# Patient Record
Sex: MALE | Race: OTHER | HISPANIC OR LATINO | URBAN - METROPOLITAN AREA
[De-identification: names, ages, dates, MRNs, and addresses within clinical notes are randomized per-mention and may not be internally consistent; named-entity substitution may affect disease eponyms.]

---

## 2018-07-18 ENCOUNTER — EMERGENCY (EMERGENCY)
Facility: HOSPITAL | Age: 43
LOS: 1 days | Discharge: HOME | End: 2018-07-18
Attending: EMERGENCY MEDICINE | Admitting: EMERGENCY MEDICINE
Payer: SELF-PAY

## 2018-07-18 VITALS
RESPIRATION RATE: 18 BRPM | DIASTOLIC BLOOD PRESSURE: 72 MMHG | OXYGEN SATURATION: 98 % | WEIGHT: 231.93 LBS | SYSTOLIC BLOOD PRESSURE: 131 MMHG | HEART RATE: 93 BPM | TEMPERATURE: 98 F

## 2018-07-18 VITALS
DIASTOLIC BLOOD PRESSURE: 78 MMHG | HEART RATE: 75 BPM | RESPIRATION RATE: 16 BRPM | SYSTOLIC BLOOD PRESSURE: 128 MMHG | TEMPERATURE: 98 F | OXYGEN SATURATION: 99 %

## 2018-07-18 DIAGNOSIS — L02.416 CUTANEOUS ABSCESS OF LEFT LOWER LIMB: ICD-10-CM

## 2018-07-18 PROCEDURE — 99283 EMERGENCY DEPT VISIT LOW MDM: CPT | Mod: 25

## 2018-07-18 PROCEDURE — 10061 I&D ABSCESS COMP/MULTIPLE: CPT

## 2018-07-18 RX ORDER — IBUPROFEN 200 MG
600 TABLET ORAL ONCE
Qty: 0 | Refills: 0 | Status: COMPLETED | OUTPATIENT
Start: 2018-07-18 | End: 2018-07-18

## 2018-07-18 RX ORDER — CEPHALEXIN 500 MG
1 CAPSULE ORAL
Qty: 20 | Refills: 0 | OUTPATIENT
Start: 2018-07-18 | End: 2018-07-27

## 2018-07-18 RX ORDER — CEPHALEXIN 500 MG
500 CAPSULE ORAL ONCE
Qty: 0 | Refills: 0 | Status: COMPLETED | OUTPATIENT
Start: 2018-07-18 | End: 2018-07-18

## 2018-07-18 RX ORDER — IBUPROFEN 200 MG
1 TABLET ORAL
Qty: 28 | Refills: 0 | OUTPATIENT
Start: 2018-07-18 | End: 2018-07-24

## 2018-07-18 RX ORDER — AZTREONAM 2 G
1 VIAL (EA) INJECTION
Qty: 20 | Refills: 0 | OUTPATIENT
Start: 2018-07-18 | End: 2018-07-27

## 2018-07-18 RX ADMIN — Medication 2 TABLET(S): at 17:56

## 2018-07-18 RX ADMIN — Medication 500 MILLIGRAM(S): at 17:56

## 2018-07-18 RX ADMIN — Medication 600 MILLIGRAM(S): at 17:56

## 2018-07-18 NOTE — ED PROVIDER NOTE - ATTENDING CONTRIBUTION TO CARE
pt w distal thigh, redness, pain, some suppuration. clinically cellulitic us shows fluid collection, drained by PA, abxs started, packing left.

## 2018-07-18 NOTE — ED PROVIDER NOTE - MEDICAL DECISION MAKING DETAILS
LLE abscess confirmed on bedside sono with overlying cellulitis, i&d performed, packed, return in 2 days for wound check/packing removal, rx bactrim, keflex, leg elevation, motrin prn pain return precautions discussed, will dc

## 2018-07-18 NOTE — ED PROVIDER NOTE - OBJECTIVE STATEMENT
43 y/o M, NKDA, presents to the ED with abscess to the left thigh. He states it started as a small pimple 1 week ago and had been progressively worsening, redness and swelling were worst on Sunday (3 days ago). He popped it yesterday with some darkish drainage. Swelling and redness have mildly improved. He reports he shaved that area recently and the pimple developed afterwards. Denies injections to the area. No fevers, no chills. Denies hx of skin infections. 43 y/o M c/o abscess to the left thigh. He states it started as a small pimple 1 week ago and had been progressively worsening, redness and swelling. He popped it yesterday with some darkish drainage. Swelling and redness have mildly improved since. He reports he shaved that area recently and the pimple developed afterwards. Denies IVDA. No fevers, no chills. Denies hx of skin infections.

## 2018-07-18 NOTE — ED PROVIDER NOTE - NS_EDPROVIDERDISPOUSERTYPE_ED_A_ED
Scribe Attestation (For Scribes USE Only)... Scribe Attestation (For Scribes USE Only).../I have personally evaluated and examined the patient. The Attending was available to me as a supervising provider if needed. Attending Attestation (For Attendings USE Only).../I have personally evaluated and examined the patient. The Attending was available to me as a supervising provider if needed./Scribe Attestation (For Scribes USE Only)...

## 2018-07-18 NOTE — ED PROVIDER NOTE - SKIN, MLM
5cm diameter area of redness that's draining. Minimal tenderness to palpation. No streaking. Soft compartments. Distal pulses intact. No sensory or motor deficits. LLE: 5cm diameter area of redness with minimal serous drainage. Mild tenderness to palpation. No streaking. Soft compartments. Distal pulses intact. No sensory or motor deficits. ambulatory

## 2018-07-18 NOTE — ED PROVIDER NOTE - MUSCULOSKELETAL, MLM
Spine appears normal, range of motion is not limited, no muscle or joint tenderness. Ambulatory as below

## 2018-10-08 ENCOUNTER — EMERGENCY (EMERGENCY)
Facility: HOSPITAL | Age: 43
LOS: 1 days | Discharge: HOME | End: 2018-10-08
Admitting: EMERGENCY MEDICINE
Payer: SELF-PAY

## 2018-10-08 VITALS
WEIGHT: 225.09 LBS | DIASTOLIC BLOOD PRESSURE: 81 MMHG | RESPIRATION RATE: 16 BRPM | TEMPERATURE: 99 F | SYSTOLIC BLOOD PRESSURE: 120 MMHG | HEART RATE: 109 BPM | OXYGEN SATURATION: 97 %
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
## 2018-10-08 DIAGNOSIS — M25.512 PAIN IN LEFT SHOULDER: ICD-10-CM

## 2018-10-08 DIAGNOSIS — S43.102A UNSPECIFIED DISLOCATION OF LEFT ACROMIOCLAVICULAR JOINT, INITIAL ENCOUNTER: ICD-10-CM

## 2018-10-08 DIAGNOSIS — S43.085A OTHER DISLOCATION OF LEFT SHOULDER JOINT, INITIAL ENCOUNTER: ICD-10-CM

## 2018-10-08 DIAGNOSIS — Z79.2 LONG TERM (CURRENT) USE OF ANTIBIOTICS: ICD-10-CM

## 2018-10-08 DIAGNOSIS — Z79.1 LONG TERM (CURRENT) USE OF NON-STEROIDAL ANTI-INFLAMMATORIES (NSAID): ICD-10-CM
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
## 2018-10-08 PROCEDURE — 23650 CLTX SHO DSLC W/MNPJ WO ANES: CPT | Mod: 54

## 2018-10-08 PROCEDURE — 73030 X-RAY EXAM OF SHOULDER: CPT | Mod: 26,LT

## 2018-10-08 PROCEDURE — 73030 X-RAY EXAM OF SHOULDER: CPT | Mod: 26,LT,76

## 2018-10-08 PROCEDURE — 99284 EMERGENCY DEPT VISIT MOD MDM: CPT | Mod: 25

## 2018-10-08 RX ORDER — OXYCODONE AND ACETAMINOPHEN 5; 325 MG/1; MG/1
1 TABLET ORAL ONCE
Qty: 0 | Refills: 0 | Status: DISCONTINUED | OUTPATIENT
Start: 2018-10-08 | End: 2018-10-08

## 2018-10-08 RX ADMIN — OXYCODONE AND ACETAMINOPHEN 1 TABLET(S): 5; 325 TABLET ORAL at 17:13

## 2018-10-08 NOTE — ED PROVIDER NOTE - DIAGNOSTIC INTERPRETATION
Xray (wet reads) interpreted by DINESH DELEON   Xray shoulder - no soft tissue swelling. no acute fx or dislocation, joint space intact, no effusion noted. No foreign body noted Xray (wet reads) interpreted by DINESH DELEON   Xray shoulder - acute anteroinferior dislocation of the L shoulder. no acute fx, joint space intact, no effusion noted. No foreign body noted Xray (wet reads) interpreted by DINESH DELEON   Xray shoulder - acute anteroinferior dislocation of the L shoulder. no acute fx, joint space intact, no effusion noted. No foreign body noted   xray shoulder post reduction - adequate alignment, complete reduction noted, no acute fx, slight AC separation, no effusion

## 2018-10-08 NOTE — ED PROVIDER NOTE - CARE PLAN
Principal Discharge DX:	Shoulder dislocation, left, initial encounter  Secondary Diagnosis:	AC separation, left, initial encounter

## 2018-10-08 NOTE — ED PROVIDER NOTE - PHYSICAL EXAMINATION
Gen - WDWN, NAD, comfortable and non-toxic appearing  Skin - warm, dry, intact   HEENT - AT/NC, airway patent, neck supple   CV - S1S2, R/R/R  Resp - CTAB, no r/r/w  GI - soft, ND, NT, no CVAT b/l   MS - w/w/p, L shoulder with TTP to the anterior shoulder region, no erythema, ecchymosis, crepitus, joint laxity, or deformity, restricted ROM 2/2 pain, NV intact. +SILT, symmetric palpable distal pulses b/l   Neuro - AxOx3, ambulatory without gait disturbance Gen - WDWN, NAD, comfortable and non-toxic appearing  Skin - warm, dry, intact   HEENT - AT/NC, airway patent, neck supple   CV - S1S2, R/R/R  Resp - CTAB, no r/r/w  GI - soft, ND, NT, no CVAT b/l   MS - w/w/p, L shoulder with TTP to the anterior shoulder region with step off and slight ecchymosis to the L proximal humerus, arm held in flexion and internal rotation, no erythema, crepitus, joint laxity, or deformity, restricted ROM 2/2 pain, NV intact. +SILT, symmetric palpable distal pulses b/l   Neuro - AxOx3, ambulatory without gait disturbance Gen - WDWN M, uncomfortable, in pain, and non-toxic appearing  Skin - warm, dry, intact   HEENT - AT/NC, airway patent, neck supple, FROM, no midline tenderness or step off   CV - S1S2, R/R/R  Resp - CTAB, no r/r/w  GI - soft, ND, NT, no CVAT b/l   MS - w/w/p, L shoulder with TTP to the anterior shoulder region with step off and slight ecchymosis to the L proximal humerus, arm held in flexion and internal rotation, no erythema, crepitus, joint laxity, or deformity, restricted ROM 2/2 pain, NV intact. +SILT, symmetric palpable distal pulses b/l   Neuro - AxOx3, ambulatory without gait disturbance

## 2018-10-08 NOTE — ED ADULT NURSE NOTE - NSFALLRSKUNASSIST_ED_ALL_ED
Normocytic anemia. Baseline is 11.5 from last admission. Currently 10.9. Most likely 2/2 fluid shifts intravascularly, and is not far from baseline.  Iron studies from 12/2016 was c/w anemia of chronic disease. Currently no signs or symptoms of active bleed.   - continue to monitor for now Normocytic anemia. Baseline is 11.5 from last admission. Currently 10.9. Most likely 2/2 fluid shifts intravascularly, and is not far from baseline.  Iron studies from 12/2016 was c/w anemia of chronic disease. Currently no signs or symptoms of active bleed.   - continue to monitor for now no

## 2018-10-08 NOTE — ED PROVIDER NOTE - NSFOLLOWUPINSTRUCTIONS_ED_ALL_ED_FT
PLEASE SEE DR. CAMARENA AT [address]LO[address] ORTHOPEDIST SUITE AT 1PM-3PM TOMORROW (10/9) FOR FOLLOW UP

## 2018-10-08 NOTE — ED ADULT NURSE NOTE - OBJECTIVE STATEMENT
Pt presents to ED with c/o left upper extremity pain after an injury on Friday while moving furniture. Pt with limited ROM 2/2 pain/injury, SILT, no paresthesias, + distal pulses.

## 2018-10-08 NOTE — ED PROCEDURE NOTE - GENERAL PROCEDURE DETAILS
Site appropriately prepped with iodine soultion.  20cc plain 1% lidocaine injected into intra-articular space of the L AC joint under aseptic techniques.  no active bleeding s/p intra-articular block and pt tolerated procedure well without complications.

## 2018-10-08 NOTE — ED PROVIDER NOTE - MEDICAL DECISION MAKING DETAILS
pt found to have _ fx, closed reduction performed at bedside by me, pt placed in _ splint, post-reduction film reveals adequate alignment, pt is NV intact post-splinting, pt instructed to RICE affected joint and f/u with Dr. Yepez tomorrow for outpt reassessment pt found to have atraumatic L shoulder dislocation x 2d old, NV intact on exam, closed reduction performed at bedside by me, pt placed in sling, post-reduction film reveals adequate alignment, case discussed with Dr. Yepez and will see pt tmr, pt is NV intact post-splinting, pt instructed to RICE affected joint and f/u with Dr. Yepez tomorrow for outpt reassessment pt found to have atraumatic L shoulder dislocation x 2d old, NV intact on exam, closed reduction performed at bedside by me, pt placed in sling, post-reduction film reveals adequate alignment, case discussed with Dr. Yepez and will see pt tmr, pt is NV intact post-reduction, pt instructed to RICE affected joint and f/u with Dr. Yepez tomorrow for outpt reassessment

## 2018-10-08 NOTE — ED ADULT NURSE NOTE - NSIMPLEMENTINTERV_GEN_ALL_ED
Implemented All Universal Safety Interventions:  [place] to call system. Call bell, personal items and telephone within reach. Instruct patient to call for assistance. Room bathroom lighting operational. Non-slip footwear when patient is off stretcher. Physically safe environment: no spills, clutter or unnecessary equipment. Stretcher in lowest position, wheels locked, appropriate side rails in place.

## 2018-10-08 NOTE — ED PROVIDER NOTE - CARE PROVIDER_API CALL
Shon Yepez), Orthopaedic Surgery  [address]  [address] [place] [address]  Phone: [phone]  Fax: [phone]

## 2018-10-08 NOTE — ED PROVIDER NOTE - OBJECTIVE STATEMENT
43 yo M with PMHx of 43 yo M with no known PMHx, RHD, presenting c/o L shoulder pain and restricted ROM 2/2 pain. Pt was helping 41 yo M with no known PMHx, RHD, presenting c/o L shoulder pain and restricted ROM 2/2 pain. Pt was helping his friends move some furniture two days ago, the furniture fell, didn't hit him, but felt the weight on his L shoulder. Noted immediate pain, restricted ROM and unable to move his shoulder/raise his arm subsequently.  Received an injection for pain by his friend (who's a RN). with no improvement. Denies fall, direct trauma, LOC, break in the skin, paresthesia, numbness, tingling, redness, bleeding, d/c, HA, dizziness, SOB, CP, palpitations, N/V, focal weakness, neck/back pain, and malaise.

## 2018-10-08 NOTE — ED PROCEDURE NOTE - NS ED PERI VASCULAR NEG
ecchymosis to the L proximal humerus/no paresthesia/fingers/toes warm to touch/no cyanosis of extremity/capillary refill time < 2 seconds

## 2018-10-08 NOTE — ED ADULT TRIAGE NOTE - CHIEF COMPLAINT QUOTE
c/o right sided shoulder pain since Friday night after helping friend move. decreased ROM to affected shoulder. denies numbness/tingling. sling placed in triage.

## 2019-05-07 ENCOUNTER — EMERGENCY (EMERGENCY)
Facility: HOSPITAL | Age: 44
LOS: 1 days | Discharge: HOME | End: 2019-05-07
Attending: EMERGENCY MEDICINE | Admitting: EMERGENCY MEDICINE
Payer: MEDICAID

## 2019-05-07 VITALS
DIASTOLIC BLOOD PRESSURE: 88 MMHG | RESPIRATION RATE: 16 BRPM | WEIGHT: 240.08 LBS | OXYGEN SATURATION: 98 % | HEART RATE: 110 BPM | SYSTOLIC BLOOD PRESSURE: 153 MMHG | TEMPERATURE: 98 F

## 2019-05-07 PROCEDURE — 99284 EMERGENCY DEPT VISIT MOD MDM: CPT

## 2019-05-07 RX ORDER — IBUPROFEN 200 MG
1 TABLET ORAL
Qty: 40 | Refills: 0 | OUTPATIENT
Start: 2019-05-07 | End: 2019-05-16

## 2019-05-07 RX ORDER — AZTREONAM 2 G
1 VIAL (EA) INJECTION
Qty: 20 | Refills: 0 | OUTPATIENT
Start: 2019-05-07 | End: 2019-05-16

## 2019-05-07 RX ORDER — VANCOMYCIN HCL 1 G
1000 VIAL (EA) INTRAVENOUS ONCE
Qty: 0 | Refills: 0 | Status: DISCONTINUED | OUTPATIENT
Start: 2019-05-07 | End: 2019-05-07

## 2019-05-07 RX ORDER — VANCOMYCIN HCL 1 G
1500 VIAL (EA) INTRAVENOUS ONCE
Qty: 0 | Refills: 0 | Status: COMPLETED | OUTPATIENT
Start: 2019-05-07 | End: 2019-05-07

## 2019-05-07 RX ORDER — LIDOCAINE HYDROCHLORIDE AND EPINEPHRINE 10; 10 MG/ML; UG/ML
10 INJECTION, SOLUTION INFILTRATION; PERINEURAL ONCE
Qty: 0 | Refills: 0 | Status: COMPLETED | OUTPATIENT
Start: 2019-05-07 | End: 2019-05-07

## 2019-05-07 RX ORDER — KETOROLAC TROMETHAMINE 30 MG/ML
30 SYRINGE (ML) INJECTION ONCE
Qty: 0 | Refills: 0 | Status: DISCONTINUED | OUTPATIENT
Start: 2019-05-07 | End: 2019-05-07

## 2019-05-07 RX ADMIN — Medication 300 MILLIGRAM(S): at 14:15

## 2019-05-07 RX ADMIN — LIDOCAINE HYDROCHLORIDE AND EPINEPHRINE 10 MILLILITER(S): 10; 10 INJECTION, SOLUTION INFILTRATION; PERINEURAL at 14:16

## 2019-05-07 RX ADMIN — Medication 30 MILLIGRAM(S): at 14:54

## 2019-05-07 NOTE — ED PROVIDER NOTE - NSFOLLOWUPINSTRUCTIONS_ED_ALL_ED_FT
- Your leg has cellulitis. No obvious drainable abscess found on the ultrasound.   - Recommend taking Antibiotics. In addition, apply warm compress to the area 3-4 times a day.  - Take Motrin 600mg every 6 hour for pain. Take medication with food.  - Take Percocet as needed for pain. While taking Percocet, you cannot drive or operate machinery.   - Return to the ED with any worsening of the symptoms such as increased pain despite the pain medication, increased swelling, or fever as you may need IV Antibiotics and/ or admission.

## 2019-05-07 NOTE — ED PROVIDER NOTE - PROGRESS NOTE DETAILS
Bedside ultrasound showing no pocket of abscess to drain. A lot of cobble stoning evident on US. Plans to IV Abx, and PO Abx, with return precaution if there is no improvements. Pt agrees. Bedside ultrasound showing no pocket of abscess to drain. A lot of cobble stoning consistent with cellulitis. Plans to IV Abx, and PO Abx with Bactrim, with return precaution if there is no improvements for IV Abx/ possible admission. Warm Compress. Pt agrees. Will huan the border of cellulitis over the calf.

## 2019-05-07 NOTE — ED PROVIDER NOTE - ATTENDING CONTRIBUTION TO CARE
43 M- no sig pmh- denies IVDA- hx of prior abscess- co swelling/redness to R leg x 2-3 days- prog worsening over the past few days  no hx of vte/no risk factors vte  no injury to site  vss  s1s2 lungs cta bl  redness, area of swelling/drainage w surrounding cellulitis  IMP- Abscess/Cellulitis  I and D, antibiotics

## 2019-05-07 NOTE — ED PROVIDER NOTE - OBJECTIVE STATEMENT
43M presenting with rt. calf abscess with drainage and swelling. Hx of abscess above left knee s/p I and D. Abscess started 5d ago, progressively getting worse, associated with drainage x today. Pt has increasing pain and swelling. Denies any medical problems and/or use of needles. Denies any fever, chills. No hx of injury to the area.

## 2019-05-07 NOTE — ED ADULT NURSE NOTE - NSIMPLEMENTINTERV_GEN_ALL_ED
Implemented All Universal Safety Interventions:  [place] to call system. Call bell, personal items and telephone within reach. Instruct patient to call for assistance. Room bathroom lighting operational. Non-slip footwear when patient is off stretcher. Physically safe environment: no spills, clutter or unnecessary equipment. Stretcher in lowest position, wheels locked, appropriate side rails in place.

## 2019-05-11 DIAGNOSIS — Z79.1 LONG TERM (CURRENT) USE OF NON-STEROIDAL ANTI-INFLAMMATORIES (NSAID): ICD-10-CM

## 2019-05-11 DIAGNOSIS — L03.115 CELLULITIS OF RIGHT LOWER LIMB: ICD-10-CM

## 2019-05-11 DIAGNOSIS — Z79.2 LONG TERM (CURRENT) USE OF ANTIBIOTICS: ICD-10-CM

## 2019-05-11 DIAGNOSIS — Z79.891 LONG TERM (CURRENT) USE OF OPIATE ANALGESIC: ICD-10-CM

## 2019-05-11 DIAGNOSIS — M79.661 PAIN IN RIGHT LOWER LEG: ICD-10-CM

## 2023-04-29 NOTE — ED ADULT NURSE NOTE - NS ED NOTE  TALK SOMEONE YN
No Bilateral Rotation Flap Text: The defect edges were debeveled with a #15 scalpel blade. Given the location of the defect, shape of the defect and the proximity to free margins a bilateral rotation flap was deemed most appropriate. Using a sterile surgical marker, an appropriate rotation flap was drawn incorporating the defect and placing the expected incisions within the relaxed skin tension lines where possible. The area thus outlined was incised deep to adipose tissue with a #15 scalpel blade. The skin margins were undermined to an appropriate distance in all directions utilizing iris scissors. Following this, the designed flap was carried over into the primary defect and sutured into place.

## 2024-06-11 ENCOUNTER — EMERGENCY (EMERGENCY)
Facility: HOSPITAL | Age: 49
LOS: 1 days | Discharge: HOME | End: 2024-06-11
Attending: EMERGENCY MEDICINE | Admitting: EMERGENCY MEDICINE
Payer: MEDICAID

## 2024-06-11 VITALS
OXYGEN SATURATION: 96 % | WEIGHT: 250 LBS | RESPIRATION RATE: 16 BRPM | HEART RATE: 98 BPM | SYSTOLIC BLOOD PRESSURE: 119 MMHG | TEMPERATURE: 98 F | DIASTOLIC BLOOD PRESSURE: 77 MMHG

## 2024-06-11 VITALS
RESPIRATION RATE: 18 BRPM | HEART RATE: 89 BPM | TEMPERATURE: 98 F | OXYGEN SATURATION: 98 % | SYSTOLIC BLOOD PRESSURE: 124 MMHG | DIASTOLIC BLOOD PRESSURE: 68 MMHG

## 2024-06-11 DIAGNOSIS — F10.129 ALCOHOL ABUSE WITH INTOXICATION, UNSPECIFIED: ICD-10-CM

## 2024-06-11 DIAGNOSIS — Z59.01 SHELTERED HOMELESSNESS: ICD-10-CM

## 2024-06-11 PROCEDURE — 99283 EMERGENCY DEPT VISIT LOW MDM: CPT | Mod: 25

## 2024-06-11 SDOH — ECONOMIC STABILITY - HOUSING INSECURITY: SHELTERED HOMELESSNESS: Z59.01

## 2024-06-11 NOTE — ED PROVIDER NOTE - CLINICAL SUMMARY MEDICAL DECISION MAKING FREE TEXT BOX
Patient with acute alcohol intoxication but is completely alert and oriented x 4.  Exam is benign.  Patient denies any current complaints.  Given food and tolerated well.  Patient is stable for discharge.

## 2024-06-11 NOTE — ED ADULT NURSE NOTE - OBJECTIVE STATEMENT
Pt A&Ox3, ambulatory with steady gait, endorses to drinking alcohol today,  presents to ED after pt was not allowed to in shelter because of alcohol intoxication. Pt A&Ox3, ambulatory with steady gait, endorses to drinking alcohol today,  presents to ED after pt was not allowed to in shelter because of alcohol intoxication. Denies any falls or injuries

## 2024-06-11 NOTE — ED PROVIDER NOTE - OBJECTIVE STATEMENT
Patient is a 48-year-old male who currently resides in a local shelter and was denied access to his bed this evening as it was noted that he appeared intoxicated.  Patient was sent here for evaluation.  Patient is completely alert and does admit to drinking this evening.  He denies any other substances.  He denies any falls, altercations, or injuries.  He denies any chest pain, shortness of breath, cough, fever, chills, abdominal pain, nausea, vomiting, diarrhea.  Patient is requesting food.

## 2024-06-11 NOTE — ED PROVIDER NOTE - PATIENT PORTAL LINK FT
You can access the FollowMyHealth Patient Portal offered by [facility] by registering at the following website: [email or web address]. By joining [employer]’s FollowMyHealth portal, you will also be able to view your health information using other applications (apps) compatible with our system.

## 2024-06-12 ENCOUNTER — EMERGENCY (EMERGENCY)
Facility: HOSPITAL | Age: 49
LOS: 1 days | Discharge: HOME | End: 2024-06-12
Attending: EMERGENCY MEDICINE | Admitting: EMERGENCY MEDICINE
Payer: MEDICAID

## 2024-06-12 ENCOUNTER — EMERGENCY (EMERGENCY)
Facility: HOSPITAL | Age: 49
LOS: 1 days | Discharge: LEFT BEFORE DISCHARGE | End: 2024-06-12
Admitting: EMERGENCY MEDICINE
Payer: MEDICAID

## 2024-06-12 VITALS
DIASTOLIC BLOOD PRESSURE: 64 MMHG | OXYGEN SATURATION: 95 % | TEMPERATURE: 98 F | HEIGHT: 70 IN | SYSTOLIC BLOOD PRESSURE: 111 MMHG | RESPIRATION RATE: 16 BRPM | WEIGHT: 259.93 LBS | HEART RATE: 101 BPM

## 2024-06-12 VITALS
OXYGEN SATURATION: 97 % | HEIGHT: 70 IN | DIASTOLIC BLOOD PRESSURE: 72 MMHG | RESPIRATION RATE: 20 BRPM | TEMPERATURE: 97 F | SYSTOLIC BLOOD PRESSURE: 131 MMHG | HEART RATE: 91 BPM

## 2024-06-12 DIAGNOSIS — J06.9 ACUTE UPPER RESPIRATORY INFECTION, UNSPECIFIED: ICD-10-CM

## 2024-06-12 DIAGNOSIS — F10.129 ALCOHOL ABUSE WITH INTOXICATION, UNSPECIFIED: ICD-10-CM

## 2024-06-12 DIAGNOSIS — R05.9 COUGH, UNSPECIFIED: ICD-10-CM

## 2024-06-12 DIAGNOSIS — Z01.89 ENCOUNTER FOR OTHER SPECIFIED SPECIAL EXAMINATIONS: ICD-10-CM

## 2024-06-12 DIAGNOSIS — J34.89 OTHER SPECIFIED DISORDERS OF NOSE AND NASAL SINUSES: ICD-10-CM

## 2024-06-12 DIAGNOSIS — E11.9 TYPE 2 DIABETES MELLITUS WITHOUT COMPLICATIONS: ICD-10-CM

## 2024-06-12 DIAGNOSIS — Z53.21 PROCEDURE AND TREATMENT NOT CARRIED OUT DUE TO PATIENT LEAVING PRIOR TO BEING SEEN BY HEALTH CARE PROVIDER: ICD-10-CM

## 2024-06-12 LAB
FLUAV AG NPH QL: SIGNIFICANT CHANGE UP
FLUBV AG NPH QL: SIGNIFICANT CHANGE UP
RSV RNA NPH QL NAA+NON-PROBE: SIGNIFICANT CHANGE UP
SARS-COV-2 RNA SPEC QL NAA+PROBE: SIGNIFICANT CHANGE UP

## 2024-06-12 PROCEDURE — 99283 EMERGENCY DEPT VISIT LOW MDM: CPT | Mod: 25

## 2024-06-12 PROCEDURE — L9991: CPT

## 2024-06-12 NOTE — ED PROVIDER NOTE - PROGRESS NOTE DETAILS
patient serology is negative, stable for discharge at this time.  Exhibiting no difficulty breathing.

## 2024-06-12 NOTE — ED PROVIDER NOTE - PHYSICAL EXAMINATION
Constitutional:  Well appearing, slurred speech   HEENT: Airway patent, Nasal mucosa clear. Mouth with normal mucosa.   Eyes: Clear bilaterally, pupils equal, round and reactive to light.  Cardiac: Normal rate, regular rhythm.  Respiratory: Breath sounds clear and equal bilaterally.  GI: Abdomen soft, non-tender, no guarding.  MSK: Spine appears normal, range of motion is not limited, no muscle or joint tenderness  Neuro: Alert and oriented, no focal deficits, no motor or sensory deficits.  Skin: Skin normal color for race, warm, dry and intact. No evidence of rash.

## 2024-06-12 NOTE — ED ADULT TRIAGE NOTE - CHIEF COMPLAINT QUOTE
BIBA from street for etoh ingestion. Pt is aox4 with clear speech and steady gait. No injuries observed at triage. Is requesting a stretcher to lay in and something to eat.

## 2024-06-12 NOTE — ED PROVIDER NOTE - PATIENT PORTAL LINK FT
Wound Clinic Note  Patient Identification:  Name:  Gurwinder Harding  Age:  60 y.o.  Sex:  male  :  1962  MRN:  [id number]   Visit Number:  [id number]  Primary Care Physician:  Pastora Person APRN     Subjective     Chief complaint:     Left shin wound    History of presenting illness:     Patient is a 60 y.o. male with past medical history significant for PVD, and current everyday smoker. Presented today for evaluation of left shin wound. Reports area has been present for approximately 1 month. He had a fall hitting a metal box. Has been applying silvadene to the area. Completed course of Bactrim. Known history of PVD with intervention in 2019. He is scheduled for US at the end of the month. Reports increase in swelling to the distal leg and foot. Denies any fever or chills. No other associated signs or symptoms reported. Minimal drainage reported. Positive for claudication.     Interval history:     2022: Patient seen in clinic today for follow-up to left shin wound. Wound covered with eschar. Small amount of drainage without odor.   Reports increase in pain. Denies any fever or chills. States compliance with treatment regimen. DONNIE: Composite Right DONNIE: 1.14 Composite Left DONNIE: 0.89. X- ray: 1.  Soft tissue edema and probable ulceration in the left mid anterior lower leg. No acute bony findings. Glucsoe 128, albumin 3.75, prealbummin 31.5, CRP <0.30, WBC 8.79, sed rate 41.     2022: Patient seen in clinic today for follow up to left shin wound. Wound vac was applied last visit. He reports increase in pain to area. Wound with increase in slough. Bone exposed. Increase in pain. Denies any fever or chills.     2022:Patient seen in clinic today for follow up to left shin wound. Wound without significant changes. He continues to report moderate to severe pain. Biopsy results concerning for acute osteomyelitis. Wound culture reports MRSA. He denies any fever or chills. Reports compliance with  treatment regimen without complications. Swelling remains present.     07/06/2022: Patient seen in clinic today for follow up to left shin wound. He continues to report pain to the area. Wound with increase in slough. Bone exposed.  Denies any fever or chills.  He is receiving Daptomycin IV at this time to treat concerns of acute osteomyelitis. Swelling remains present to left foot and ankle. No new issues or concerns reported.    07/15/2022: Patient seen in clinic today for follow up to left shin wound. He continues to report pain to the area. Wound continues with slough Bone exposed does appear to have some areas of granulation present.  Denies any fever or chills.  He is receiving Daptomycin IV at this time to treat acute osteomyelitis. Swelling remains present to left foot and ankle. No new issues or concerns reported.    07/29/2022: Patient seen in clinic today for follow up to left shin wound. He continues to report pain to the area. Wound continues with slough Bone remains exposed increased granulation tissue. Denies any fever or chills.  He is receiving Daptomycin IV at this time to treat acute osteomyelitis 2 more weeks of antibiotics. Swelling remains present to left foot and ankle. No new issues or concerns reported.    08/05/2022: Patient seen in clinic today for follow-up to left shin wound. Wound continues with exposed bone, does appear to have granulation tissue present. He continues to report pain, this is unchanged from prior. Continues with IV antibiotics at this time. Denies any fever or chills. No new issues or concerns reported. Tolerating current wound treatment.     08/11/2022: Patient seen in clinic today for follow up to left shin wound. He continues to report pain to the area. Wound base red and moist. Bone remains exposed, with small area and granulation has increased.  Denies any fever or chills.  Completed course of Daptomycin. Swelling remains present to left foot and ankle. No new  issues or concerns reported.  ---------------------------------------------------------------------------------------------------------------------   Review of Systems   Constitutional: Negative for chills and fever.   HENT: Negative for congestion and rhinorrhea.    Respiratory: Negative for cough and shortness of breath.    Cardiovascular: Positive for leg swelling. Negative for chest pain.   Gastrointestinal: Negative for diarrhea, nausea and vomiting.   Musculoskeletal: Negative for gait problem.   Skin: Positive for wound.   Neurological: Negative for dizziness and weakness.      ---------------------------------------------------------------------------------------------------------------------   Past Medical History:   Diagnosis Date   • Alcohol abuse    • Arthritis    • Carotid stenosis    • Cerebrovascular accident (CVA) due to occlusion of left carotid artery (HCC) 07/15/2021   • ED (erectile dysfunction)    • History of degenerative disc disease    • Hydrocele in adult 12/29/2020   • Hypertension    • MRSA (methicillin resistant staph aureus) culture positive     patient states diagnosed approx 2 wks ago   • Neuropathy    • Peripheral vascular disease (HCC)     s/p arthrectomy follow by balloon angioplasty and stents of right and left SFA   • Tobacco abuse    • Vitamin D deficiency      Past Surgical History:   Procedure Laterality Date   • APPENDECTOMY     • ARTERIOGRAM N/A 9/19/2019    Procedure: Arteriogram;  Surgeon: Tong Azar MD;  Location: [facility] INVASIVE LOCATION;  Service: Cardiology   • BACK SURGERY      DISC RUPTURE REPAIR, L5   • CARDIAC CATHETERIZATION Right 10/29/2019    Procedure: Peripheral angiography;  Surgeon: Tong Azar MD;  Location: [facility] INVASIVE LOCATION;  Service: Cardiovascular   • ENDOSCOPY N/A 2/18/2022    Procedure: ESOPHAGOGASTRODUODENOSCOPY;  Surgeon: Christine Duran MD;  Location: [facility] OR;  Service: General;  Laterality: N/A;   • VASCULAR SURGERY  Bilateral      Family History   Problem Relation Age of Onset   • Hypertension Mother    • Cancer Father         LUNG   • Diabetes Father    • Hypertension Father    • Heart attack Other         GRANDFATHER     Social History     Socioeconomic History   • Marital status:    • Number of children: 1   Tobacco Use   • Smoking status: Current Every Day Smoker     Packs/day: 0.50     Years: 30.00     Pack years: 15.00     Types: Cigarettes   • Smokeless tobacco: Never Used   Vaping Use   • Vaping Use: Never used   Substance and Sexual Activity   • Alcohol use: Not Currently     Alcohol/week: 24.0 standard drinks     Types: 24 Cans of beer per week   • Drug use: No   • Sexual activity: Defer     ---------------------------------------------------------------------------------------------------------------------   Allergies:  Penicillins and Novocain [procaine]  ---------------------------------------------------------------------------------------------------------------------  Objective     ---------------------------------------------------------------------------------------------------------------------   Vital Signs:  Temp:  [97.7 °F (36.5 °C)] 97.7 °F (36.5 °C)  Heart Rate:  [80] 80  Resp:  [16] 16  BP: (157)/(86) 157/86  No data found.  There were no vitals filed for this visit.     on   ;      There is no height or weight on file to calculate BMI.  Wt Readings from Last 3 Encounters:   07/15/22 88.3 kg (194 lb 9.6 oz)   06/10/22 81.6 kg (180 lb)   05/26/22 82.1 kg (181 lb)       ---------------------------------------------------------------------------------------------------------------------   Physical Exam  Constitutional: Vital sign were reviewed (temperature, pulse, respiration, and blood pressure) and found to be within expected limits, general appearance was assessed and the patient was found to be in mild distress and calm and comfortable appears  Skin: Temperature:normal turgor and  temperatureColor: normal, no cyanosis, jaundice, pallor or bruising, Moisture: dry,Nails: thickened yellow toenails bed, Hair:thinning to lower extremities .  Physical Exam  Wound Assessment: Location: left anterior, lower, leg  Changes since last exam: bioburden coverage has diminished  and granulation has improved   Etiology and classification: non-pressure chronic ulcer   Wound bed structures/characteristics: full-thickness (subcutaneous tissue is exposed in at least a portion of the wound), necrotic bone is visible or palpable in the wound bed) Increase in granulation tissue  Drainage characteristics: moderate, serous drainage  Edges moist  Periwound characteristics: some mositure-realted inflammation  Perfusion characteristics: suggest some degree of PAD  Bioburden characteristics:slough, soft eschar is present , 50-75% of wound bed     Wound Goal (s):Free of infection, No further symptoms and Reduction of contamination  Assessment & Plan      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     Will set up IV antibiotics with Daptomycin pharmacy to dose, 2 more weeks of treatment., completed.     Reviewed with patient 06/17/2022:  A/BI: Composite Right DONNIE: 1.14 Composite Left DONNIE: 0.89. X- ray: 1.  Soft tissue edema and probable ulceration in the left mid anterior lower leg. No acute bony findings. Glucsoe 128, albumin 3.75, prealbummin 31.5, CRP <0.30, WBC 8.79, sed rate 41.     Obesity (BMI 30-39.9) [E66.9]- An obese person?is at greater risk for wound infection and dehiscence or evisceration.    PVD (peripheral vascular disease) with claudication (HCC) [I73.9]- DONNIE of left slightly decreased. He is following with vascular.    Tobacco abuse [Z72.0]- Tissue perfusion is lower in users of tobacco and other forms of nicotine. Reduced tissue perfusion strongly inhibits wound healing, increases risk  of infection, and dramatically increases risk of limb loss.    Clinical Impression:Moderate Complexity    Follow-up: 1 week    KEVIN Camargo   [facility]  08/11/2022  [id number]   You can access the FollowMyHealth Patient Portal offered by [facility] by registering at the following website: [email or web address]. By joining [employer]’s FollowMyHealth portal, you will also be able to view your health information using other applications (apps) compatible with our system.

## 2024-06-12 NOTE — ED ADULT NURSE NOTE - NSFALLUNIVINTERV_ED_ALL_ED
7 uris Bed/Stretcher in lowest position, wheels locked, appropriate side rails in place/Call bell, personal items and telephone in reach/Instruct patient to call for assistance before getting out of bed/chair/stretcher/Non-slip footwear applied when patient is off stretcher/[place] to call system/Physically safe environment - no spills, clutter or unnecessary equipment/Purposeful proactive rounding/Room/bathroom lighting operational, light cord in reach

## 2024-06-12 NOTE — ED PROVIDER NOTE - CLINICAL SUMMARY MEDICAL DECISION MAKING FREE TEXT BOX
A/P: 48-year-old male with a history of type 2 diabetes, alcohol abuse presented to the emergency room complaining of rhinorrhea, loss of taste and smell.  States he has had the symptoms for the past 2 days, also with cough. patient admits to drinking alcohol today, was in the ED earlier for alcohol intoxication.  --  Differential includes but not limited to URI, alcohol intoxication, COVID, flu  -- patient has normal vital signs, no hypoxia, no  respiratory distress  -- plan to obtain serology

## 2024-06-12 NOTE — ED PROVIDER NOTE - OBJECTIVE STATEMENT
48-year-old male with a history of type 2 diabetes, alcohol abuse presented to the emergency room complaining of rhinorrhea, loss of taste and smell.  States he has had the symptoms for the past 2 days, also with cough. patient admits to drinking alcohol today, was in the ED earlier for alcohol intoxication.  Denies chest pain or shortness of breath.

## 2024-06-12 NOTE — ED PROVIDER NOTE - NSFOLLOWUPINSTRUCTIONS_ED_ALL_ED_FT
Upper Respiratory Infection, Adult  An upper respiratory infection (URI) is a common viral infection of the nose, throat, and upper air passages that lead to the lungs. The most common type of URI is the common cold. URIs usually get better on their own, without medical treatment.    What are the causes?  A URI is caused by a virus. You may catch a virus by:  Breathing in droplets from an infected person's cough or sneeze.  Touching something that has been exposed to the virus (is contaminated) and then touching your mouth, nose, or eyes.  What increases the risk?  You are more likely to get a URI if:  You are very young or very old.  You have close contact with others, such as at work, school, or a health care facility.  You smoke.  You have long-term (chronic) heart or lung disease.  You have a weakened disease-fighting system (immune system).  You have nasal allergies or asthma.  You are experiencing a lot of stress.  You have poor nutrition.  What are the signs or symptoms?  A URI usually involves some of the following symptoms:  Runny or stuffy (congested) nose.  Cough.  Sneezing.  Sore throat.  Headache.  Fatigue.  Fever.  Loss of appetite.  Pain in your forehead, behind your eyes, and over your cheekbones (sinus pain).  Muscle aches.  Redness or irritation of the eyes.  Pressure in the ears or face.  How is this diagnosed?  This condition may be diagnosed based on your medical history and symptoms, and a physical exam. Your health care provider may use a swab to take a mucus sample from your nose (nasal swab). This sample can be tested to determine what virus is causing the illness.    How is this treated?  URIs usually get better on their own within 7–10 days. Medicines cannot cure URIs, but your health care provider may recommend certain medicines to help relieve symptoms, such as:  Over-the-counter cold medicines.  Cough suppressants. Coughing is a type of defense against infection that helps to clear the respiratory system, so take these medicines only as recommended by your health care provider.  Fever-reducing medicines.  Follow these instructions at home:  Activity    Rest as needed.  If you have a fever, stay home from work or school until your fever is gone or until your health care provider says your URI cannot spread to other people (is no longer contagious). Your health care provider may have you wear a face mask to prevent your infection from spreading.  Relieving symptoms    Gargle with a mixture of salt and water 3–4 times a day or as needed. To make salt water, completely dissolve ½–1 tsp (3–6 g) of salt in 1 cup (237 mL) of warm water.  Use a cool-mist humidifier to add moisture to the air. This can help you breathe more easily.  Eating and drinking    A comparison of three sample cups showing dark yellow, yellow, and pale yellow urine.  Drink enough fluid to keep your urine pale yellow.  Eat soups and other clear broths.  General instructions    A sign showing that a person should not smoke.  Take over-the-counter and prescription medicines only as told by your health care provider. These include cold medicines, fever reducers, and cough suppressants.  Do not use any products that contain nicotine or tobacco. These products include cigarettes, chewing tobacco, and vaping devices, such as e-cigarettes. If you need help quitting, ask your health care provider.  Stay away from secondhand smoke.  Stay up to date on all immunizations, including the yearly (annual) flu vaccine.  Keep all follow-up visits. This is important.  How to prevent the spread of infection to others    Washing hands with soap and water.  URIs can be contagious. To prevent the infection from spreading:  Wash your hands with soap and water for at least 20 seconds. If soap and water are not available, use hand .  Avoid touching your mouth, face, eyes, or nose.  Cough or sneeze into a tissue or your sleeve or elbow instead of into your hand or into the air.  Contact a health care provider if:  You are getting worse instead of better.  You have a fever or chills.  Your mucus is brown or red.  You have yellow or brown discharge coming from your nose.  You have pain in your face, especially when you bend forward.  You have swollen neck glands.  You have pain while swallowing.  You have white areas in the back of your throat.  Get help right away if:  You have shortness of breath that gets worse.  You have severe or persistent:  Headache.  Ear pain.  Sinus pain.  Chest pain.  You have chronic lung disease along with any of the following:  Making high-pitched whistling sounds when you breathe, most often when you breathe out (wheezing).  Prolonged cough (more than 14 days).  Coughing up blood.  A change in your usual mucus.  You have a stiff neck.  You have changes in your:  Vision.  Hearing.  Thinking.  Mood.  These symptoms may be an emergency. Get help right away. Call 911.  Do not wait to see if the symptoms will go away.  Do not drive yourself to the hospital.  Summary  An upper respiratory infection (URI) is a common infection of the nose, throat, and upper air passages that lead to the lungs.  A URI is caused by a virus.  URIs usually get better on their own within 7–10 days.  Medicines cannot cure URIs, but your health care provider may recommend certain medicines to help relieve symptoms.  This information is not intended to replace advice given to you by your health care provider. Make sure you discuss any questions you have with your health care provider.    Document Revised: 07/20/2022 Document Reviewed: 07/20/2022  [employer] Patient Education © 2024 [employer]

## 2024-06-13 PROBLEM — E11.9 TYPE 2 DIABETES MELLITUS WITHOUT COMPLICATIONS: Chronic | Status: ACTIVE | Noted: 2024-06-12

## 2025-02-18 NOTE — ED ADULT TRIAGE NOTE - NS ED NURSE AMBULANCES
----- Message from Tashia Vallejo sent at 2/18/2025 11:41 AM EST -----  Let patient know Urine culture is NEG  
Patient notified & verbalized understanding.  
[facility]